# Patient Record
Sex: FEMALE | Race: WHITE | NOT HISPANIC OR LATINO | Employment: FULL TIME | URBAN - METROPOLITAN AREA
[De-identification: names, ages, dates, MRNs, and addresses within clinical notes are randomized per-mention and may not be internally consistent; named-entity substitution may affect disease eponyms.]

---

## 2018-01-17 NOTE — PROGRESS NOTES
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 01) (G47 01)    Plan  Acute sinusitis, Congested, Health Maintenance, Facial pain, HSV-1 infection, Sleep  disturbance    · ALPRAZolam 0 5 MG Oral Tablet; TAKE 1 TABLET Bedtime  Insomnia due to medical condition    · Follow Up if Not Better Evaluation and Treatment  Follow-up  Status: Complete  Done:  [id number] 02:07PM   · Call [phone] if: The symptoms seem worse ; Status:Complete;   Done:  [id number] 02:07PM   · Call [phone] if: You still are unable to sleep through the night after 2 weeks ;  Status:Complete;   Done: 15AMT1138 02:07PM    Chief Complaint  Patient is here today to discuss difficulty falling asleep and Alprazolam  lb/lpn      History of Present Illness  Sleep Disorders (Brief): The patient is being seen for a routine clinic follow-up of a sleep disorder  The patient has Sleep disturbance secondary to treatment of autoimmune disease with medrol  Symptoms:  difficulty falling asleep and early awakening  The patient is currently asymptomatic  No associated symptoms are reported  Current treatment includes good sleep hygiene and alcohol avoidance  By report, there is good compliance with treatment  Since diagnosis the disease has been unchanged  Recently, the disease has been unchanged  There are no known disease complications  Pertinent medical history:  no obesity, no asthma, no chronic obstructive pulmonary disease, no congestive heart failure, no environmental allergies, no hypertension, no cardiovascular disease, no cerebrovascular disease, no gastroesophageal reflux disease and no alcohol abuse  Review of Systems    Constitutional: No fever, no chills, feels well, no tiredness, no recent weight gain or loss  Musculoskeletal: arthralgias, but no complaints of arthralgia, no myalgia, no joint swelling or stiffness, no limb pain or swelling     Integumentary: no complaints of skin rash or lesion, no itching or dry skin, no skin wounds  Neurological: no complaints of headache, no confusion, no numbness or tingling, no dizziness or fainting  Active Problems    1  Acute sinusitis (461 9) (J01 90)   2  Congested (478 19) (R09 81)   3  Facial pain (784 0) (R51)   4  HSV-1 infection (054 9) (B00 9)   5  Sleep disturbance (780 50) (G47 9)    Past Medical History    1  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)   2  History of alopecia (V13 89) (Z87 898)   3  History of autoimmune disorder (V12 29) (Z86 2)  Active Problems And Past Medical History Reviewed: The active problems and past medical history were reviewed and updated today  Family History    1  Family history of cardiac disorder (V17 49) (Z82 49)   2  Family history of myocardial infarction (V17 3) (Z82 49)  Family History Reviewed: The family history was reviewed and updated today  Social History    · Full-time employment   · Never a smoker   · No caffeine use   · Occasional alcohol use  The social history was reviewed and updated today  Surgical History    1  History of Ankle Surgery  Surgical History Reviewed: The surgical history was reviewed and updated today  Current Meds   1  ALPRAZolam 0 25 MG Oral Tablet; TAKE 1 TABLET AT BEDTIME AS NEEDED; Therapy: 24Ekh8796 to (Evaluate:28Jan2016); Last Rx:[id number] Ordered   2  CellCept 250 MG Oral Capsule; TAKE 1 CAPSULE TWICE DAILY; Therapy: (Recorded:26Ldv5869) to Recorded   3  MethylPREDNISolone 8 MG Oral Tablet; Take 1 tablet daily; Therapy: (JAXNUMUT:[id number]) to Recorded   4  ValACYclovir HCl - 1 GM Oral Tablet; Take 1 tablet daily; Therapy: (Recorded:21Azi0495) to Recorded   5  ValACYclovir HCl - 500 MG Oral Tablet; TAKE 1 TABLET Twice daily PRN; Therapy: 72LTS5690 to (Evaluate:24Hve6224)  Requested for: 78Sgo8732; Last   Rx:[id number] Ordered    The medication list was reviewed and updated today  Allergies    1  Penicillins   2   Sulfa Drugs    Vitals Recorded: 25YPP7903 01:29PM   Temperature 99 F, Tympanic    Heart Rate 52, R Radial    Pulse Quality Normal, R Radial    Respiration 16    Respiration Quality Normal    Systolic 374, LUE, Sitting    Diastolic 72, LUE, Sitting    Height 5 ft 4 25 in    Weight 134 lb     BMI Calculated 22 82    BSA Calculated 1 65    Patient Refused Height No No   Patient Refused Weight No No     Physical Exam    Constitutional   General appearance: No acute distress, well appearing and well nourished  Neurologic   Cranial nerves: Cranial nerves 2-12 intact      Psychiatric   Orientation to person, place, and time: Normal     Mood and affect: Normal          Signatures   Electronically signed by : Isaac Sweet MD; Jan 28 2016  2:12PM EST                       (Author)

## 2018-10-12 ENCOUNTER — OFFICE VISIT (OUTPATIENT)
Dept: FAMILY MEDICINE CLINIC | Facility: CLINIC | Age: 28
End: 2018-10-12
Payer: COMMERCIAL

## 2018-10-12 VITALS
TEMPERATURE: 97.9 F | BODY MASS INDEX: 26.66 KG/M2 | HEART RATE: 64 BPM | DIASTOLIC BLOOD PRESSURE: 82 MMHG | SYSTOLIC BLOOD PRESSURE: 110 MMHG | RESPIRATION RATE: 18 BRPM | HEIGHT: 65 IN | WEIGHT: 160 LBS

## 2018-10-12 DIAGNOSIS — B00.2 RECURRENT ORAL HERPES SIMPLEX: ICD-10-CM

## 2018-10-12 DIAGNOSIS — Z23 NEED FOR INFLUENZA VACCINATION: ICD-10-CM

## 2018-10-12 DIAGNOSIS — L40.9 PSORIASIS: ICD-10-CM

## 2018-10-12 DIAGNOSIS — K50.00 CROHN'S DISEASE OF SMALL INTESTINE WITHOUT COMPLICATION (HCC): ICD-10-CM

## 2018-10-12 PROCEDURE — 90686 IIV4 VACC NO PRSV 0.5 ML IM: CPT

## 2018-10-12 PROCEDURE — 90471 IMMUNIZATION ADMIN: CPT

## 2018-10-12 PROCEDURE — 1036F TOBACCO NON-USER: CPT | Performed by: FAMILY MEDICINE

## 2018-10-12 PROCEDURE — 99213 OFFICE O/P EST LOW 20 MIN: CPT | Performed by: FAMILY MEDICINE

## 2018-10-12 RX ORDER — VALACYCLOVIR HYDROCHLORIDE 1 G/1
1000 TABLET, FILM COATED ORAL DAILY
Qty: 90 TABLET | Refills: 3 | Status: SHIPPED | OUTPATIENT
Start: 2018-10-12 | End: 2019-10-01 | Stop reason: SDUPTHER

## 2018-10-12 RX ORDER — DROSPIRENONE AND ETHINYL ESTRADIOL 0.02-3(28)
KIT ORAL
Refills: 0 | COMMUNITY
Start: 2018-08-15

## 2018-10-12 RX ORDER — BETAMETHASONE VALERATE 0.1 %
LOTION (ML) TOPICAL DAILY
Qty: 60 ML | Refills: 2 | Status: SHIPPED | OUTPATIENT
Start: 2018-10-12 | End: 2019-10-01

## 2018-10-12 RX ORDER — INFLIXIMAB 100 MG/10ML
INJECTION, POWDER, LYOPHILIZED, FOR SOLUTION INTRAVENOUS
Refills: 6 | COMMUNITY
Start: 2018-09-17

## 2018-10-12 NOTE — PROGRESS NOTES
Assessment/Plan:   Herpes simplex type 1  Psoriasis  Crohn's disease doing well  Valtrex 1000 milligrams daily  Betamethasone valerate lotion apply daily to the posterior scalp  Flu vaccine today  Continue Remicade treatments  Follow-up 3 months  Call with any questions or concerns  Diagnoses and all orders for this visit:    Crohn's disease of small intestine without complication ([facility] [address] )    Need for influenza vaccination  -     Cancel: influenza vaccine, [id number], quadrivalent, recombinant, PF, 0 5 mL, for patients 18-49 yr with comorbidities (FLUBLOK)  -     SYRINGE/SINGLE-DOSE VIAL: influenza vaccine, [id number], quadrivalent, 0 5 mL, preservative-free, for patients 3+ yr (FLUZONE)    Psoriasis  -     betamethasone valerate (VALISONE) 0 1 % lotion; Apply topically daily for 60 days    Herpes simplex  -     valACYclovir (VALTREX) 1,000 mg tablet; Take 1 tablet (1,000 mg total) by mouth daily for 90 days    Other orders  -     drospirenone-ethinyl estradiol (LORELEI) 3-0 02 MG per tablet;   -     REMICADE 100 MG;   -     Water For Injection Sterile (STERILE WATER); Subjective:     Patient ID: Anita Kline is a 33 y o  female  This is a 56-year-old female who presents for follow-up of recurrence herpes simplex type 1  Review of Systems   Constitutional: Negative  Respiratory: Negative  Cardiovascular: Negative  Neurological: Negative  Objective:     Physical Exam   Constitutional: She is oriented to person, place, and time  She appears well-developed and well-nourished  HENT:   Head: Normocephalic and atraumatic  Right Ear: External ear normal    Left Ear: External ear normal    Nose: Nose normal    Mouth/Throat: Oropharynx is clear and moist  No oropharyngeal exudate  Cardiovascular: Normal rate, regular rhythm and normal heart sounds  Exam reveals no gallop and no friction rub  No murmur heard    Pulmonary/Chest: Effort normal and breath sounds normal  No respiratory distress  She has no wheezes  She has no rales  She exhibits no tenderness  Neurological: She is alert and oriented to person, place, and time  No cranial nerve deficit  Skin:   Psoriatic patch posterior scalp

## 2019-10-01 ENCOUNTER — OFFICE VISIT (OUTPATIENT)
Dept: FAMILY MEDICINE CLINIC | Facility: CLINIC | Age: 29
End: 2019-10-01
Payer: COMMERCIAL

## 2019-10-01 VITALS
WEIGHT: 166 LBS | BODY MASS INDEX: 27.66 KG/M2 | RESPIRATION RATE: 16 BRPM | HEIGHT: 65 IN | OXYGEN SATURATION: 98 % | HEART RATE: 62 BPM | DIASTOLIC BLOOD PRESSURE: 70 MMHG | TEMPERATURE: 98.4 F | SYSTOLIC BLOOD PRESSURE: 114 MMHG

## 2019-10-01 DIAGNOSIS — Z23 NEED FOR INFLUENZA VACCINATION: Primary | ICD-10-CM

## 2019-10-01 DIAGNOSIS — Z23 NEED FOR TDAP VACCINATION: ICD-10-CM

## 2019-10-01 DIAGNOSIS — B00.2 RECURRENT ORAL HERPES SIMPLEX: ICD-10-CM

## 2019-10-01 PROCEDURE — 90471 IMMUNIZATION ADMIN: CPT

## 2019-10-01 PROCEDURE — 99213 OFFICE O/P EST LOW 20 MIN: CPT | Performed by: NURSE PRACTITIONER

## 2019-10-01 PROCEDURE — 90686 IIV4 VACC NO PRSV 0.5 ML IM: CPT

## 2019-10-01 PROCEDURE — 3008F BODY MASS INDEX DOCD: CPT | Performed by: NURSE PRACTITIONER

## 2019-10-01 PROCEDURE — 90715 TDAP VACCINE 7 YRS/> IM: CPT

## 2019-10-01 PROCEDURE — 90472 IMMUNIZATION ADMIN EACH ADD: CPT

## 2019-10-01 RX ORDER — VALACYCLOVIR HYDROCHLORIDE 1 G/1
1000 TABLET, FILM COATED ORAL DAILY
Qty: 90 TABLET | Refills: 3 | Status: SHIPPED | OUTPATIENT
Start: 2019-10-01 | End: 2020-09-16 | Stop reason: SDUPTHER

## 2019-10-01 RX ORDER — VALACYCLOVIR HYDROCHLORIDE 1 G/1
TABLET, FILM COATED ORAL
Refills: 0 | COMMUNITY
Start: 2019-07-02 | End: 2019-10-01 | Stop reason: SDUPTHER

## 2019-10-01 NOTE — PROGRESS NOTES
Assessment/Plan:    1  Need for influenza vaccination  -     influenza vaccine, [id number], quadrivalent, 0 5 mL, preservative-free, for adult and pediatric patients 6 mos+ (AFLURIA, FLUARIX, FLULAVAL, FLUZONE)    2  Recurrent oral herpes simplex  -     valACYclovir (VALTREX) 1,000 mg tablet; Take 1 tablet (1,000 mg total) by mouth daily    3  Need for Tdap vaccination  -     TDAP VACCINE GREATER THAN OR EQUAL TO 6YO IM      Return for Annual physical     Subjective:      Patient ID: Vadim Logan is a 30 y o  female  Chief Complaint   Patient presents with   Shahana Tara is a 30year old female who presents to the office for medication refill on valcylcovir for herpes simplex I  Denies any recent breakouts and reports that her symptoms stay under control as long as she takes the medication  No other acute complaints today  The following portions of the patient's history were reviewed and updated as appropriate: allergies, current medications, past family history, past medical history, past social history, past surgical history and problem list     Review of Systems   Constitutional: Negative for chills and fever  Respiratory: Negative for shortness of breath  Cardiovascular: Negative for chest pain  Skin: Negative for rash  Current Outpatient Medications   Medication Sig Dispense Refill    betamethasone valerate (VALISONE) 0 1 % lotion Apply topically daily for 60 days 60 mL 2    drospirenone-ethinyl estradiol (LORELEI) 3-0 02 MG per tablet   0    REMICADE 100 MG   6    valACYclovir (VALTREX) 1,000 mg tablet Take 1 tablet (1,000 mg total) by mouth daily 90 tablet 3     No current facility-administered medications for this visit          Objective:    /70   Pulse 62   Temp 98 4 °F (36 9 °C) (Temporal)   Resp 16   Ht 5' 5" (1 651 m)   Wt 75 3 kg (166 lb)   SpO2 98%   BMI 27 62 kg/m²        Physical Exam   Constitutional: She is oriented to person, place, and time  She appears well-developed and well-nourished  No distress  HENT:   Head: Normocephalic and atraumatic  Eyes: Conjunctivae are normal  Right eye exhibits no discharge  Left eye exhibits no discharge  Neck: Normal range of motion  Neck supple  No thyromegaly present  Cardiovascular: Normal rate, regular rhythm and normal heart sounds  Pulmonary/Chest: Effort normal and breath sounds normal  No respiratory distress  She has no decreased breath sounds  She has no wheezes  She has no rhonchi  She has no rales  Lymphadenopathy:     She has no cervical adenopathy  Neurological: She is alert and oriented to person, place, and time  Skin: Skin is warm and dry  No rash noted  She is not diaphoretic  Psychiatric: She has a normal mood and affect   Her behavior is normal  Judgment and thought content normal          Jeff Lab, CRNP

## 2020-09-16 DIAGNOSIS — B00.2 RECURRENT ORAL HERPES SIMPLEX: ICD-10-CM

## 2020-09-16 RX ORDER — VALACYCLOVIR HYDROCHLORIDE 1 G/1
1000 TABLET, FILM COATED ORAL DAILY
Qty: 90 TABLET | Refills: 3 | Status: SHIPPED | OUTPATIENT
Start: 2020-09-16 | End: 2020-12-15

## 2020-09-23 DIAGNOSIS — Z00.00 ENCOUNTER FOR ANNUAL PHYSICAL EXAM: Primary | ICD-10-CM

## 2020-09-23 DIAGNOSIS — Z13.220 SCREENING FOR LIPID DISORDERS: ICD-10-CM

## 2020-09-23 DIAGNOSIS — Z11.4 SCREENING FOR HIV (HUMAN IMMUNODEFICIENCY VIRUS): ICD-10-CM

## 2020-09-23 DIAGNOSIS — Z13.29 SCREENING FOR THYROID DISORDER: ICD-10-CM

## 2020-09-23 DIAGNOSIS — Z13.6 SCREENING FOR HYPERTENSION: ICD-10-CM
